# Patient Record
Sex: FEMALE | Race: ASIAN | URBAN - METROPOLITAN AREA
[De-identification: names, ages, dates, MRNs, and addresses within clinical notes are randomized per-mention and may not be internally consistent; named-entity substitution may affect disease eponyms.]

---

## 2023-07-19 ENCOUNTER — OFFICE VISIT (OUTPATIENT)
Facility: LOCATION | Age: 83
End: 2023-07-19
Payer: COMMERCIAL

## 2023-07-19 DIAGNOSIS — H40.1131 PRIMARY OPEN-ANGLE GLAUCOMA BILATERAL MILD STAGE: Primary | ICD-10-CM

## 2023-07-19 DIAGNOSIS — H04.123 DRY EYE SYNDROME OF BILATERAL LACRIMAL GLANDS: ICD-10-CM

## 2023-07-19 DIAGNOSIS — H26.493 OTHER SECONDARY CATARACT, BILATERAL: ICD-10-CM

## 2023-07-19 PROCEDURE — 99213 OFFICE O/P EST LOW 20 MIN: CPT | Performed by: OPHTHALMOLOGY

## 2023-07-19 ASSESSMENT — INTRAOCULAR PRESSURE
OD: 10
OS: 11

## 2023-07-19 NOTE — IMPRESSION/PLAN
Impression: 6 month f/u with IOP check. Unable to perform OCT [address] due to patient's poor cooperation. POHx: POAG mild OU, S/p CEIOL/Hydrus OD 3/2021, s/p CEIOL OS 2015, Dry AMD OU, PVD OU, Old BRVO OS FOHx: (-) for glaucoma. PMHx: HTN ,Ulcers, weight loss, Gastic cancer (currently active), skin rashes, dementia. Eye medications: Refresh PRN OU. Pre-op Latanoprost OU. TMax: unknown. Target IOP: < 16 OU. Plan: Testing: OCT [place] 2/2021: OD WNL, OS extrafoveal ERM. OCT [address] 12/2021: OD thin I>N, OS bdl thin S. OU progression since 2014, stable since 5/2020. HVF 24-2 4/2022: OD reliable, normal, OS high FL, scattered changes. OU improved. Pachys: [id number]. Gonio 1/2023: OU CBB 2+ 360, OD N Hydrus in good position - post dilation. Today:
IOP acceptable OU. Plan:
Monitor off drops. RTC in 12 months with IOP check, DFE OU, and gonio. ***No more VFs and OCT ONH due to mild glaucoma***.

## 2023-07-19 NOTE — IMPRESSION/PLAN
Impression: Examination revealed posterior capsule opacification. Trace PCO OU. Not visually significant. Patient is happy with vision at this time. Plan: Monitor. Will revisit once patient is visually bothered by PCO.

## 2023-07-19 NOTE — IMPRESSION/PLAN
Impression: Examination revealed dry eye syndrome secondary to tear deficiencies. Trace SPK OU. H/o punctal plugs with improvement. Patient reports using ATs 1x/week. States does not feel MAI. Plan: Continue ATs PRN OU. Recommend patient to use ATs more often if patient feels MAI symptoms.

## 2024-07-17 ENCOUNTER — OFFICE VISIT (OUTPATIENT)
Facility: LOCATION | Age: 84
End: 2024-07-17
Payer: MEDICARE

## 2024-07-17 DIAGNOSIS — H40.1131 PRIMARY OPEN-ANGLE GLAUCOMA BILATERAL MILD STAGE: Primary | ICD-10-CM

## 2024-07-17 DIAGNOSIS — H26.493 OTHER SECONDARY CATARACT, BILATERAL: ICD-10-CM

## 2024-07-17 DIAGNOSIS — H04.123 DRY EYE SYNDROME OF BILATERAL LACRIMAL GLANDS: ICD-10-CM

## 2024-07-17 PROCEDURE — 99214 OFFICE O/P EST MOD 30 MIN: CPT | Performed by: OPHTHALMOLOGY

## 2024-07-17 PROCEDURE — 92020 GONIOSCOPY: CPT | Performed by: OPHTHALMOLOGY

## 2024-07-17 ASSESSMENT — INTRAOCULAR PRESSURE
OS: 11
OD: 11

## 2025-07-16 ENCOUNTER — OFFICE VISIT (OUTPATIENT)
Facility: LOCATION | Age: 85
End: 2025-07-16
Payer: MEDICARE

## 2025-07-16 DIAGNOSIS — H40.1131 PRIMARY OPEN-ANGLE GLAUCOMA BILATERAL MILD STAGE: Primary | ICD-10-CM

## 2025-07-16 DIAGNOSIS — H26.493 OTHER SECONDARY CATARACT, BILATERAL: ICD-10-CM

## 2025-07-16 DIAGNOSIS — H04.123 DRY EYE SYNDROME OF BILATERAL LACRIMAL GLANDS: ICD-10-CM

## 2025-07-16 PROCEDURE — 99214 OFFICE O/P EST MOD 30 MIN: CPT | Performed by: OPHTHALMOLOGY

## 2025-07-16 PROCEDURE — 92020 GONIOSCOPY: CPT | Performed by: OPHTHALMOLOGY

## 2025-07-16 ASSESSMENT — INTRAOCULAR PRESSURE
OS: 12
OD: 12